# Patient Record
Sex: MALE | Race: AMERICAN INDIAN OR ALASKA NATIVE
[De-identification: names, ages, dates, MRNs, and addresses within clinical notes are randomized per-mention and may not be internally consistent; named-entity substitution may affect disease eponyms.]

---

## 2020-07-12 ENCOUNTER — HOSPITAL ENCOUNTER (EMERGENCY)
Dept: HOSPITAL 5 - ED | Age: 30
Discharge: LEFT BEFORE DISCHARGE | End: 2020-07-12
Payer: MEDICAID

## 2020-07-12 VITALS — DIASTOLIC BLOOD PRESSURE: 74 MMHG | SYSTOLIC BLOOD PRESSURE: 117 MMHG

## 2020-07-12 DIAGNOSIS — R69: Primary | ICD-10-CM

## 2020-07-12 DIAGNOSIS — Z53.21: ICD-10-CM

## 2020-07-12 NOTE — EMERGENCY DEPARTMENT REPORT
Chief Complaint: Abdominal Pain


Stated Complaint: CHECK FOR ILLNESS


Time Seen by Provider: 07/12/20 10:36





- HPI


History of Present Illness: 





29-year-old -American male presents to the emergency room stating that 

his stomach is compressed.  Patient wanted to be tested for COVID.  He complains

of a runny nose.  Patient states that he has no fever no chills no nausea no 

vomiting no pain at all.  Patient denies any diarrhea constipation.  Mother 

reports that the smokes a lot for cigarettes and weed and will sometimes have a 

cough.  His mother reports that he is autistic.





- Exam


Vital Signs: 


                                   Vital Signs











  07/12/20





  07:29


 


Temperature 98.7 F


 


Pulse Rate 87


 


Respiratory 20





Rate 


 


Blood Pressure 117/74


 


O2 Sat by Pulse 96





Oximetry 











Physical Exam: 





Gen: alert oriented NAD nontoxic in appearance





Cardic: regular rate and rhythm no murmurs appreciated





Resp: Clear to auscultation bilateral no wheezing no rales or rhonchi.  Not 

coughing





Abdomen: Soft nontender nondistended normal bowel sounds.





Patient is ambulatory without difficulties


MSE screening note: 


Focused history and physical exam performed.


Due to findings the following was ordered:








29-year-old -American male presents to the emergency room stating that 

his stomach is compressed.  Patient wanted to be tested for COVID.  He complains

of a runny nose.  Patient states that he has no fever no chills no nausea no 

vomiting no pain at all.  Patient denies any diarrhea constipation.  Mother 

reports that the smokes a lot for cigarettes and weed and will sometimes have a 

cough.  His mother reports that he is autistic.











Discussed with patient that he can follow-up with his primary care provider and 

he can go to [facility] for COVID testing





ED Disposition for MSE


Disposition: Z-07 MED SCREENING EXAM-LEFT


Is pt being admited?: No


Does the pt Need Aspirin: No


Condition: Stable


Additional Instructions: 


Follow-up with your primary care provider.  [place] 

testing


Referrals: 


PRIMARY CARE,MD [Primary Care Provider] - 3-5 Days


Your, primary care provider [Other] - 3-5 Days